# Patient Record
(demographics unavailable — no encounter records)

---

## 2024-11-07 NOTE — PLAN
[FreeTextEntry1] : Examination Pap smear was done.  Patient has a history of abnormal Pap in the past. Prescription given for mammogram and sonogram. Perimenopause menopause was discussed with the patient.

## 2024-11-07 NOTE — PHYSICAL EXAM
[Chaperone Present] : A chaperone was present in the examining room during all aspects of the physical examination [71832] : A chaperone was present during the pelvic exam. [FreeTextEntry2] : Day Ferreira [Appropriately responsive] : appropriately responsive [Alert] : alert [No Acute Distress] : no acute distress [No Lymphadenopathy] : no lymphadenopathy [Regular Rate Rhythm] : regular rate rhythm [No Murmurs] : no murmurs [Clear to Auscultation B/L] : clear to auscultation bilaterally [Soft] : soft [Non-tender] : non-tender [Non-distended] : non-distended [No HSM] : No HSM [No Lesions] : no lesions [No Mass] : no mass [Oriented x3] : oriented x3 [Examination Of The Breasts] : a normal appearance [No Masses] : no breast masses were palpable [Labia Majora] : normal [Labia Minora] : normal [Normal] : normal [Uterine Adnexae] : normal